# Patient Record
Sex: FEMALE | Race: OTHER | NOT HISPANIC OR LATINO | Employment: UNEMPLOYED | URBAN - METROPOLITAN AREA
[De-identification: names, ages, dates, MRNs, and addresses within clinical notes are randomized per-mention and may not be internally consistent; named-entity substitution may affect disease eponyms.]

---

## 2019-02-06 ENCOUNTER — HOSPITAL ENCOUNTER (EMERGENCY)
Facility: HOSPITAL | Age: 54
Discharge: HOME | End: 2019-02-06
Attending: EMERGENCY MEDICINE
Payer: COMMERCIAL

## 2019-02-06 VITALS
HEART RATE: 82 BPM | OXYGEN SATURATION: 99 % | RESPIRATION RATE: 16 BRPM | TEMPERATURE: 98.6 F | BODY MASS INDEX: 23.12 KG/M2 | HEIGHT: 64 IN | WEIGHT: 135.4 LBS | SYSTOLIC BLOOD PRESSURE: 129 MMHG | DIASTOLIC BLOOD PRESSURE: 62 MMHG

## 2019-02-06 DIAGNOSIS — N89.8 VAGINAL DISCHARGE: Primary | ICD-10-CM

## 2019-02-06 LAB — EXT PREG TEST URINE: NEGATIVE

## 2019-02-06 PROCEDURE — 99283 EMERGENCY DEPT VISIT LOW MDM: CPT

## 2019-02-06 PROCEDURE — 87591 N.GONORRHOEAE DNA AMP PROB: CPT | Performed by: EMERGENCY MEDICINE

## 2019-02-06 PROCEDURE — 81025 URINE PREGNANCY TEST: CPT | Performed by: EMERGENCY MEDICINE

## 2019-02-06 PROCEDURE — 87491 CHLMYD TRACH DNA AMP PROBE: CPT | Performed by: EMERGENCY MEDICINE

## 2019-02-06 NOTE — ED PROVIDER NOTES
History  Chief Complaint   Patient presents with    Vaginal Discharge     Pt reports vaginal discharge for the past 8 days  Denies pain  HPI     50-year-old previously healthy female presenting for evaluation of vaginal discharge has been present for the last 8 days  Described as clear or cream-colored, thin, at times copious, reports changing a panty liner 2-3 times per day  States that 2 days ago it was rust colored as if it contained dried blood  This has since resolved and now the discharge is clear again  She had her last menstrual period at age 38, no vaginal bleeding since  Denies pain  No itching or irritation  No history of sexually transmitted illnesses  Denies fevers or chills  No history of gyn pathology  Denies burning with urination, urinary frequency, or blood in her urine  No blood in her stool, black tarry stools, or diarrhea  Denies nausea or vomiting  None       History reviewed  No pertinent past medical history  History reviewed  No pertinent surgical history  History reviewed  No pertinent family history  I have reviewed and agree with the history as documented  Social History   Substance Use Topics    Smoking status: Never Smoker    Smokeless tobacco: Never Used    Alcohol use No        Review of Systems   Constitutional: Negative for chills and fever  HENT: Negative for congestion  Eyes: Negative for visual disturbance  Respiratory: Negative for cough and shortness of breath  Cardiovascular: Negative for chest pain and leg swelling  Gastrointestinal: Negative for abdominal pain, diarrhea, nausea and vomiting  Genitourinary: Positive for vaginal discharge  Negative for dysuria, flank pain, frequency, hematuria, menstrual problem, pelvic pain, vaginal bleeding and vaginal pain  Musculoskeletal: Negative for arthralgias, back pain, neck pain and neck stiffness  Skin: Negative for rash     Neurological: Negative for weakness, numbness and headaches  Psychiatric/Behavioral: Negative for agitation, behavioral problems and confusion  Physical Exam  Physical Exam   Constitutional: She is oriented to person, place, and time  She appears well-developed and well-nourished  No distress  HENT:   Head: Normocephalic and atraumatic  Right Ear: External ear normal    Left Ear: External ear normal    Nose: Nose normal    Mouth/Throat: Oropharynx is clear and moist    Eyes: Conjunctivae are normal    Neck: Normal range of motion  Neck supple  Cardiovascular: Normal rate, regular rhythm and normal heart sounds  Exam reveals no gallop and no friction rub  No murmur heard  Pulmonary/Chest: Effort normal and breath sounds normal  No respiratory distress  She has no wheezes  She has no rales  Abdominal: Soft  Bowel sounds are normal  She exhibits no distension  There is no tenderness  There is no guarding  Genitourinary: Uterus normal  There is no rash, tenderness or lesion on the right labia  There is no rash, tenderness or lesion on the left labia  Uterus is not deviated, not enlarged, not fixed and not tender  Cervix exhibits no motion tenderness and no friability  Right adnexum displays no mass, no tenderness and no fullness  Left adnexum displays no mass, no tenderness and no fullness  No erythema, tenderness or bleeding in the vagina  Vaginal discharge (thin, clear, scant) found  Musculoskeletal: Normal range of motion  She exhibits no edema or deformity  Neurological: She is alert and oriented to person, place, and time  She exhibits normal muscle tone  Skin: Skin is warm and dry  She is not diaphoretic         Vital Signs  ED Triage Vitals [02/06/19 1204]   Temperature Pulse Respirations Blood Pressure SpO2   98 6 °F (37 °C) 82 16 129/62 99 %      Temp Source Heart Rate Source Patient Position - Orthostatic VS BP Location FiO2 (%)   Oral Monitor Sitting Left arm --      Pain Score       No Pain           Vitals:    02/06/19 1204 BP: 129/62   Pulse: 82   Patient Position - Orthostatic VS: Sitting       Visual Acuity      ED Medications  Medications - No data to display    Diagnostic Studies  Results Reviewed     Procedure Component Value Units Date/Time    POCT pregnancy, urine [[id number]]  (Normal) Resulted:  02/06/19 1402    Lab Status:  Final result Updated:  02/06/19 1402     EXT PREG TEST UR (Ref: Negative) negative    Chlamydia/GC amplified DNA by PCR [[id number]] Collected:  02/06/19 1354    Lab Status: In process Specimen:  Urine from Urine, Other Updated:  02/06/19 1400                 No orders to display              Procedures  Procedures       Phone Contacts  ED Phone Contact    ED Course                               MDM  Number of Diagnoses or Management Options  Vaginal discharge: new and requires workup  Diagnosis management comments: Generally well appearing  Afebrile and hemodynamically stable  Patient has some thin clear vaginal discharge on pelvic exam, normal bimanual   No cervical motion tenderness, no visible masses  Discussed with the patient that I have a low suspicion for infection, but will send GC testing and she will receive a phone call if this is positive  Suspect that this is physiologic discharge, possibly increased secondary to hormonal changes  Recommend follow up with her OBGYN  She was counseled that spotting after menopause can be secondary to malignancy, so it is very important that she follow up with her OBGYN to rule this out  No indication for imaging in the absence of pain  No evidence of PID  Patient discharged in good condition with OBGYN follow-up         Amount and/or Complexity of Data Reviewed  Clinical lab tests: ordered    Patient Progress  Patient progress: stable           Disposition  Final diagnoses:   Vaginal discharge     Time reflects when diagnosis was documented in both MDM as applicable and the Disposition within this note     Time User Action Codes Description Comment 2/6/2019  2:04 PM Kimberli Hirsch Add [N89 8] Vaginal discharge       ED Disposition     ED Disposition Condition Date/Time Comment    Discharge  Wed Feb 6, 2019  2:04 PM BRITTNY SAM [facility] discharge to home/self care  Condition at discharge: Good        Follow-up Information     Follow up With Specialties Details Why Contact Info Additional [facility] Emergency Department Emergency Medicine  For abdominal pain, fevers, or heavy vaginal bleeding  [address] [facility] [id number] [place] [address] ED, [address] [place] [address], [facility]          There are no discharge medications for this patient  No discharge procedures on file      ED Provider  Electronically Signed by           Alban Hassan MD  02/06/19 2309

## 2019-02-06 NOTE — DISCHARGE INSTRUCTIONS
Vaginal Discharge   WHAT YOU NEED TO KNOW:   Vaginal discharge is normal  It is usually clear or white and odorless  A change in the amount, smell, or color may indicate an underlying problem  Irritation, itching, or burning may also be a sign of a problem  Infection, a foreign body, or chemical irritants can cause abnormal vaginal discharge  Birth control pills, creams, or jellies may also cause abnormal vaginal discharge  DISCHARGE INSTRUCTIONS:   Medicines:   · Medicines  may help fight a fungal or bacterial infection  The medicine may be given as a pill  You may instead get a cream or gel you insert into your vagina  · Take your medicine as directed  Contact your healthcare provider if you think your medicine is not helping or if you have side effects  Tell him of her if you are allergic to any medicine  Keep a list of the medicines, vitamins, and herbs you take  Include the amounts, and when and why you take them  Bring the list or the pill bottles to follow-up visits  Carry your medicine list with you in case of an emergency  Contact your healthcare provider or gynecologist if:   · Your symptoms do not get better in 3 to 5 days  · You have trouble urinating, or you urinate often and with urgency  · You have abdominal pain or cramps  · Your discharge is bloody and it is not your monthly period  · You have pain with sexual intercourse  · You have a fever or chills  · You have low back pain or side pain  · You have questions or concerns about your condition or care  Self-care:   · Clean in and around your vagina with mild soap and warm water each day  Gently dry the area after washing  · Take a sitz bath  Fill a bathtub with 4 to 6 inches of warm water  You may also use a sitz bath pan that fits over a toilet  Sit in the sitz bath for 20 minutes  Do this 2 to 3 times a day, or as directed  The warm water can help decrease pain and swelling       · Do not wear tight-fitting clothes or undergarments  These can make your symptoms worse  · Ask about douching  Douching may help decrease your symptoms  Use a solution of 5 tablespoons of white vinegar mixed with 2 liters of warm water  · Do not have sex until your symptoms go away  Have your partner wear a condom until you complete your course of medication  Follow up with your healthcare provider or gynecologist in 1 week:  Write down your questions so you remember to ask them during your visits  © 2017 [employer] Information is for End User's use only and may not be sold, redistributed or otherwise used for commercial purposes  All illustrations and images included in CareNotes® are the copyrighted property of A D [employer] M [employer]  or Yury Agarwal  The above information is an  only  It is not intended as medical advice for individual conditions or treatments  Talk to your doctor, nurse or pharmacist before following any medical regimen to see if it is safe and effective for you

## 2019-02-07 LAB
C TRACH DNA SPEC QL NAA+PROBE: NEGATIVE
N GONORRHOEA DNA SPEC QL NAA+PROBE: NEGATIVE

## 2021-09-20 ENCOUNTER — HOSPITAL ENCOUNTER (EMERGENCY)
Facility: HOSPITAL | Age: 56
Discharge: HOME | End: 2021-09-20
Attending: EMERGENCY MEDICINE
Payer: COMMERCIAL

## 2021-09-20 VITALS
OXYGEN SATURATION: 97 % | SYSTOLIC BLOOD PRESSURE: 119 MMHG | TEMPERATURE: 98.9 F | BODY MASS INDEX: 22.2 KG/M2 | DIASTOLIC BLOOD PRESSURE: 76 MMHG | WEIGHT: 130 LBS | RESPIRATION RATE: 18 BRPM | HEIGHT: 64 IN | HEART RATE: 90 BPM

## 2021-09-20 DIAGNOSIS — R05.9 COUGH: Primary | ICD-10-CM

## 2021-09-20 DIAGNOSIS — R50.9 FEVER: ICD-10-CM

## 2021-09-20 DIAGNOSIS — Z20.822 COVID-19 VIRUS TEST RESULT UNKNOWN: ICD-10-CM

## 2021-09-20 LAB — SARS-COV-2 RNA RESP QL NAA+PROBE: POSITIVE

## 2021-09-20 PROCEDURE — U0003 INFECTIOUS AGENT DETECTION BY NUCLEIC ACID (DNA OR RNA); SEVERE ACUTE RESPIRATORY SYNDROME CORONAVIRUS 2 (SARS-COV-2) (CORONAVIRUS DISEASE [COVID-19]), AMPLIFIED PROBE TECHNIQUE, MAKING USE OF HIGH THROUGHPUT TECHNOLOGIES AS DESCRIBED BY CMS-2020-01-R: HCPCS | Performed by: EMERGENCY MEDICINE

## 2021-09-20 PROCEDURE — U0005 INFEC AGEN DETEC AMPLI PROBE: HCPCS | Performed by: EMERGENCY MEDICINE

## 2021-09-20 PROCEDURE — 99283 EMERGENCY DEPT VISIT LOW MDM: CPT

## 2021-09-20 PROCEDURE — 99284 EMERGENCY DEPT VISIT MOD MDM: CPT | Performed by: EMERGENCY MEDICINE

## 2021-09-20 NOTE — ED PROVIDER NOTES
Pt Name: Virgilio Ashley  MRN: [id number]  [place] 1965  Age/Sex: 61 y o  female  Date of evaluation: 9/20/2021  PCP: Orlando Jones PA-C    CHIEF COMPLAINT    Chief Complaint   Patient presents with    Cough     cough and fever,  sick with same          HPI    61 y o  female presenting with cough and fever  Patient has had approximately 5 days of cough and fever after being exposed to known COVID positive patient  She denies shortness of breath, trauma, chest pain, numbness, weakness, other symptoms  Patient has been taking ibuprofen with improvement of fevers at home  HPI      Past Medical and Surgical History    No past medical history on file  No past surgical history on file  No family history on file  Social History     Tobacco Use    Smoking status: Never Smoker    Smokeless tobacco: Never Used   Substance Use Topics    Alcohol use: No    Drug use: No           Allergies    No Known Allergies    Home Medications    Prior to Admission medications    Not on File           Review of Systems    Review of Systems   Constitutional: Positive for fever  Negative for activity change and chills  HENT: Negative for drooling and facial swelling  Eyes: Negative for pain, discharge and visual disturbance  Respiratory: Positive for cough  Negative for apnea, chest tightness, shortness of breath and wheezing  Cardiovascular: Negative for chest pain and leg swelling  Gastrointestinal: Negative for abdominal pain, constipation, diarrhea, nausea and vomiting  Genitourinary: Negative for difficulty urinating, dysuria and urgency  Musculoskeletal: Negative for arthralgias, back pain and gait problem  Skin: Negative for color change and rash  Neurological: Negative for dizziness, speech difficulty, weakness and headaches  Psychiatric/Behavioral: Negative for agitation, behavioral problems and confusion  All other systems reviewed and negative      Physical Exam      ED Triage Vitals [09/20/21 1742]   Temperature Pulse Respirations Blood Pressure SpO2   98 9 °F (37 2 °C) 90 18 119/76 97 %      Temp Source Heart Rate Source Patient Position - Orthostatic VS BP Location FiO2 (%)   Oral Monitor Sitting Left arm --      Pain Score       No Pain               Physical Exam  Vitals and nursing note reviewed  Constitutional:       General: She is not in acute distress  Appearance: She is well-developed  She is not ill-appearing or toxic-appearing  HENT:      Head: Normocephalic and atraumatic  Right Ear: External ear normal       Left Ear: External ear normal    Eyes:      Conjunctiva/sclera: Conjunctivae normal       Pupils: Pupils are equal, round, and reactive to light  Cardiovascular:      Rate and Rhythm: Normal rate and regular rhythm  Heart sounds: Normal heart sounds  Pulmonary:      Effort: Pulmonary effort is normal  No respiratory distress  Breath sounds: Normal breath sounds  No wheezing or rales  Abdominal:      General: There is no distension  Palpations: Abdomen is soft  Tenderness: There is no abdominal tenderness  There is no guarding or rebound  Musculoskeletal:         General: No deformity  Normal range of motion  Cervical back: Normal range of motion and neck supple  Skin:     General: Skin is warm and dry  Findings: No erythema or rash  Neurological:      Mental Status: She is alert and oriented to person, place, and time  Psychiatric:         Behavior: Behavior normal          Thought Content: Thought content normal          Judgment: Judgment normal               Diagnostic Results      Labs:    Results Reviewed     Procedure Component Value Units Date/Time    Novel Coronavirus [facility] [place] [facility] - 2 Hour Stat [[id number]]  (Abnormal) Collected: 09/20/21 1936    Lab Status: Final result Specimen: Nares from Nasopharyngeal Swab Updated: 09/20/21 2018     SARS-CoV-2 Positive    Narrative:       The specimen collection materials, transport medium, and/or testing methodology utilized in the production of these test results have been proven to be reliable in a limited validation with an abbreviated program under the Emergency Utilization Authorization provided by the FDA  Testing reported as "Presumptive positive" will be confirmed with secondary testing to ensure result accuracy  Clinical caution and judgement should be used with the interpretation of these results with consideration of the clinical impression and other laboratory testing  Testing reported as "Positive" or "Negative" has been proven to be accurate according to standard laboratory validation requirements  All testing is performed with control materials showing appropriate reactivity at standard intervals  All labs reviewed and utilized in the medical decision making process    Radiology:    No orders to display       All radiology studies independently viewed by me and interpreted by the radiologist     Procedure    Procedures        ED Course of Care and Re-Assessments        Medications - No data to display        FINAL IMPRESSION    Final diagnoses:   Cough   Fever   COVID-19 virus test result unknown         DISPOSITION/PLAN    Fever and cough as above  Overall felt most consistent with viral pneumonia  COVID suspected most likely etiology based on given history  Low suspicion for bacterial pneumonia, sepsis, PE, other acute life threatening alternate pathology at this time  COVID-19 test sent and pending at this time  Patient not held in emergency department at this time as test is a send out and will not result within the next hour, and the result will not   Patient does not meet current hospital criteria for admission for novel coronavirus at this time based on vital signs, history, and clinical examination    The patient was counseled regarding the possibility of COVID-19 among other considerations in the differential diagnosis as above  Patient counseled extensively regarding conservative measures to take at home for symptomatic treatment, symptoms and reasons to return for further evaluation and care, and quarantine/isolation and other infection control measures  Patient also consult regarding availability of monoclonal antibody therapy and to [facility] for exploration of same if test is positive  Patient indicated understanding of the above, all questions answered  Discharged with strict return precautions, follow up with primary care doctor  Time reflects when diagnosis was documented in both MDM as applicable and the Disposition within this note     Time User Action Codes Description Comment    9/20/2021  7:30 PM Gaetana Kohut Add [R05] Cough     9/20/2021  7:30 PM Gaetana Kohut Add [R50 9] Fever     9/20/2021  7:31 PM Gaetana Kohut Add [Z20 822] COVID-19 virus test result unknown       ED Disposition     ED Disposition Condition Date/Time Comment    Discharge Stable Mon Sep 20, 2021  7:30 PM [facility] [place]  Follow-up Information     Follow up With Specialties Details Why Contact Info Additional [facility] Emergency Department Emergency Medicine Go to  If symptoms worsen 34 [facility] [id number] [facility] Emergency Department, [address] [place] [address], [facility], Alina Birmingham PA-C Physician Assistant Call in 1 day To discuss this visit and consider monoclonal antibody therapy if indicated   [address] [place] [address]               PATIENT REFERRED TO:    [address] [facility] Emergency Department  [address] [facility] [address] [phone]  Go to   If symptoms worsen    Bobby Lei PA-C  [address] [employer] [address] [facility]  [phone]    Call in 1 day  To discuss this visit and consider monoclonal antibody therapy if indicated  DISCHARGE MEDICATIONS:    Discharge Medication List as of 9/20/2021  7:31 PM      START taking these medications    Details   Misc  Devices (Pulse Oximeter) MISC Please check your oxygen saturation every 4 hours while awake or if you experience shortness of breath, Print             No discharge procedures on file           MD Olu Gunn MD  09/21/21 7722